# Patient Record
Sex: MALE | URBAN - METROPOLITAN AREA
[De-identification: names, ages, dates, MRNs, and addresses within clinical notes are randomized per-mention and may not be internally consistent; named-entity substitution may affect disease eponyms.]

---

## 2021-02-22 ENCOUNTER — EMERGENCY (EMERGENCY)
Facility: HOSPITAL | Age: 21
LOS: 1 days | Discharge: HOME | End: 2021-02-22
Admitting: EMERGENCY MEDICINE
Payer: COMMERCIAL

## 2021-02-22 VITALS
DIASTOLIC BLOOD PRESSURE: 89 MMHG | TEMPERATURE: 99 F | RESPIRATION RATE: 16 BRPM | OXYGEN SATURATION: 100 % | SYSTOLIC BLOOD PRESSURE: 149 MMHG | HEART RATE: 95 BPM

## 2021-02-22 LAB — SARS-COV-2 RNA SPEC QL NAA+PROBE: DETECTED

## 2021-02-22 PROCEDURE — 99284 EMERGENCY DEPT VISIT MOD MDM: CPT

## 2021-02-22 NOTE — ED ADULT TRIAGE NOTE - CHIEF COMPLAINT QUOTE
Pt lives in same house as grandmother who has Covid symptoms, pt had mild symptoms last week including body aches and chills, now only has loss of taste. Denies any other symptoms currently. Only wishes to be tested for covid.

## 2021-02-22 NOTE — ED PROVIDER NOTE - PATIENT PORTAL LINK FT
You can access the FollowMyHealth Patient Portal offered by [facility] by registering at the following website: [email or web address]. By joining [facility]’s FollowMyHealth portal, you will also be able to view your health information using other applications (apps) compatible with our system.

## 2021-02-22 NOTE — ED PROVIDER NOTE - OBJECTIVE STATEMENT
20yM w/no stated pmhx presenting to have a covid test. Pt states 7 days ago he developed chills with body aches, slight dry cough and subjective fever which he had for 3 days. He states he is feeling well now but continues to have loss of taste. Pts grandparents at home with covid like symptoms. Pt denies chest pain, shortness of breath, headache, dizziness, weakness, abd pain, n/v/d, recent travel or illness or any other concerns.

## 2021-02-22 NOTE — ED PROVIDER NOTE - NSFOLLOWUPINSTRUCTIONS_ED_ALL_ED_FT
Follow up with your primary care doctor within 1 week  Take Tylenol 650mg every 6 hours as needed for fever or body aches (if they return)  Return to the ER with any worsening or concerning symptoms, fever, weakness, chest pain, shortness of breath or any other concerns.

## 2021-02-22 NOTE — ED PROVIDER NOTE - CLINICAL SUMMARY MEDICAL DECISION MAKING FREE TEXT BOX
20yM w/no stated pmhx presenting to have a covid test. Pt reports symptoms of subjective fever/chills/body aches, dry cough last week. Now he is feeling well but continues to have loss of sense of taste. Will test for covid. Pt to f/u with his PMD